# Patient Record
Sex: MALE | Race: WHITE | NOT HISPANIC OR LATINO | Employment: OTHER | URBAN - METROPOLITAN AREA
[De-identification: names, ages, dates, MRNs, and addresses within clinical notes are randomized per-mention and may not be internally consistent; named-entity substitution may affect disease eponyms.]

---

## 2023-08-28 RX ORDER — QUETIAPINE 300 MG/1
300 TABLET, FILM COATED, EXTENDED RELEASE ORAL DAILY
COMMUNITY

## 2023-08-28 RX ORDER — AMLODIPINE BESYLATE 5 MG/1
TABLET ORAL
COMMUNITY

## 2023-08-28 RX ORDER — PHENAZOPYRIDINE HYDROCHLORIDE 200 MG/1
TABLET, FILM COATED ORAL
COMMUNITY

## 2023-08-28 RX ORDER — ACETAMINOPHEN 325 MG/1
TABLET ORAL
COMMUNITY

## 2023-08-28 RX ORDER — METOPROLOL SUCCINATE 50 MG/1
50 TABLET, EXTENDED RELEASE ORAL DAILY
COMMUNITY

## 2023-08-28 RX ORDER — BUPROPION HYDROCHLORIDE 100 MG/1
TABLET, EXTENDED RELEASE ORAL
COMMUNITY

## 2023-08-28 RX ORDER — LISINOPRIL AND HYDROCHLOROTHIAZIDE 12.5; 1 MG/1; MG/1
TABLET ORAL
COMMUNITY

## 2023-08-28 RX ORDER — CLONIDINE HYDROCHLORIDE 0.1 MG/1
TABLET ORAL
COMMUNITY

## 2023-08-28 RX ORDER — LANOLIN ALCOHOL/MO/W.PET/CERES
CREAM (GRAM) TOPICAL
COMMUNITY

## 2023-08-28 RX ORDER — LOPERAMIDE HYDROCHLORIDE 2 MG/1
2 CAPSULE ORAL
COMMUNITY
Start: 2023-03-06

## 2023-08-28 RX ORDER — OMEPRAZOLE 40 MG/1
CAPSULE, DELAYED RELEASE ORAL
COMMUNITY

## 2023-08-28 RX ORDER — LANOLIN ALCOHOL/MO/W.PET/CERES
400 CREAM (GRAM) TOPICAL DAILY
COMMUNITY

## 2023-08-28 RX ORDER — TAMSULOSIN HYDROCHLORIDE 0.4 MG/1
CAPSULE ORAL
COMMUNITY

## 2023-08-28 RX ORDER — TRAZODONE HYDROCHLORIDE 100 MG/1
200 TABLET ORAL DAILY
COMMUNITY

## 2023-08-29 ENCOUNTER — TELEPHONE (OUTPATIENT)
Dept: GASTROENTEROLOGY | Facility: CLINIC | Age: 78
End: 2023-08-29

## 2023-08-29 ENCOUNTER — OFFICE VISIT (OUTPATIENT)
Dept: GASTROENTEROLOGY | Facility: CLINIC | Age: 78
End: 2023-08-29

## 2023-08-29 VITALS
WEIGHT: 259 LBS | HEIGHT: 70 IN | SYSTOLIC BLOOD PRESSURE: 152 MMHG | DIASTOLIC BLOOD PRESSURE: 98 MMHG | BODY MASS INDEX: 37.08 KG/M2

## 2023-08-29 DIAGNOSIS — Z79.01 LONG TERM CURRENT USE OF ANTICOAGULANT: ICD-10-CM

## 2023-08-29 DIAGNOSIS — Z12.12 SCREENING FOR COLORECTAL CANCER: Primary | ICD-10-CM

## 2023-08-29 DIAGNOSIS — Z12.11 SCREENING FOR COLORECTAL CANCER: Primary | ICD-10-CM

## 2023-08-29 RX ORDER — DOXAZOSIN MESYLATE 1 MG/1
1 TABLET ORAL DAILY
COMMUNITY

## 2023-08-29 RX ORDER — POLYETHYLENE GLYCOL 3350, SODIUM SULFATE ANHYDROUS, SODIUM BICARBONATE, SODIUM CHLORIDE, POTASSIUM CHLORIDE 236; 22.74; 6.74; 5.86; 2.97 G/4L; G/4L; G/4L; G/4L; G/4L
4000 POWDER, FOR SOLUTION ORAL ONCE
Qty: 4000 ML | Refills: 0 | Status: SHIPPED | OUTPATIENT
Start: 2023-08-29 | End: 2023-08-29

## 2023-08-29 RX ORDER — LORAZEPAM 1 MG/1
TABLET ORAL
COMMUNITY

## 2023-08-29 NOTE — PROGRESS NOTES
[address] [facility] - Outpatient Consultation  Lisa Heredia 78 y.o. male MRN: [id number]  Encounter: [id number]    ASSESSMENT AND PLAN:      1. Screening for colorectal cancer  Patient is due for colon cancer screening with colonoscopy and is at average risk for colorectal cancer. Prior colonoscopy: 2017  Patient is on Eliquis. Will need to hold for 2 days prior to his colonoscopy    We will schedule the patient for colonoscopy and have advised the patient to take the bowel preparation in a split dose bowel preparation. I have discussed with the patient the risks and benefits and alternatives of the procedure which include but are not limited to bleeding, infection, aspiration, perforation.       - Colonoscopy; Future  - polyethylene glycol (Golytely) 4000 mL solution; Take 4,000 mL by mouth once for 1 dose Take 4000 mL by mouth once for 1 dose. Use as directed  Dispense: 4000 mL; Refill: 0    2. Long term current use of anticoagulant  We will need to hold Eliquis for 2 days prior to colonoscopy. Patient on chronic anticoagulation. Risks and benefits of holding the anticoagulation prior to the procedure discussed with the patient. Patient understands the risks of embolic events while holding the anticoagulation. Patient understands the risks of bleeding if continuation of the anticoagulation is chosen. Follow up Appointment: For colonoscopy    Chief Complaint   Patient presents with   • Colonscopy consult     Pt is due for screening colonoscopy. Father had colon cancer. Pt is not experiencing any GI symptoms. HPI:   Patient is a 72-year-old male past medical history of renal cell carcinoma status post nephrectomy with recurrent lesions in the kidney, A-fib on Eliquis, AVELINA, CKD, who is presenting for colorectal cancer screening. Patient does have a family history of colorectal cancer in his father. Last colonoscopy was in 2017.   He does have history of polyps in the past.    Patient stated no reported lightheadedness, dizziness, fevers, chills, nausea, vomiting, dysphagia, heartburn, SOB, CP, abdominal pain, changes in bowel movement with diarrhea or constipation, GI bleeding, or unintentional weight loss. Historical Information   No past medical history on file. No past surgical history on file. Social History     Substance and Sexual Activity   Alcohol Use Not on file     Social History     Substance and Sexual Activity   Drug Use Not on file     Social History     Tobacco Use   Smoking Status Not on file   Smokeless Tobacco Not on file     No family history on file. Meds/Allergies     Current Outpatient Medications:   •  acetaminophen (TYLENOL) 325 mg tablet  •  amLODIPine (NORVASC) 5 mg tablet  •  apixaban (Eliquis) 2.5 mg  •  Cholecalciferol 125 MCG (5000 UT) TABS  •  cyanocobalamin (VITAMIN B-12) 2500 MCG TABS  •  doxazosin (CARDURA) 1 mg tablet  •  folic acid (FOLVITE) 187 mcg tablet  •  loperamide (IMODIUM) 2 mg capsule  •  metoprolol succinate (TOPROL-XL) 50 mg 24 hr tablet  •  omeprazole (PriLOSEC) 40 MG capsule  •  polyethylene glycol (Golytely) 4000 mL solution  •  QUEtiapine (SEROquel XR) 300 mg 24 hr tablet  •  traZODone (DESYREL) 100 mg tablet  •  buPROPion (WELLBUTRIN SR) 100 mg 12 hr tablet  •  cloNIDine (CATAPRES) 0.1 mg tablet  •  lisinopril-hydrochlorothiazide (PRINZIDE,ZESTORETIC) 10-12.5 MG per tablet  •  LORazepam (ATIVAN) 1 mg tablet  •  phenazopyridine (PYRIDIUM) 200 mg tablet  •  tamsulosin (FLOMAX) 0.4 mg  •  thiamine 100 MG tablet    Allergies   Allergen Reactions   • Morphine And Related Other (See Comments)     History of opioid use and dependence. The patient and family do not want opioids administered without express consent beforehand. PHYSICAL EXAM:    Blood pressure 152/98, height 5' 10" (1.778 m), weight 117 kg (259 lb). Body mass index is 37.16 kg/m².   General Appearance: NAD, cooperative, alert  Eyes: Anicteric  GI:  Soft, non-tender, non-distended; normal bowel sounds; no masses, no organomegaly   Rectal: Deferred  Musculoskeletal: No edema. Skin:  No jaundice    Lab Results:   No results found for: "WBC", "HGB", "MCV", "PLT", "INR"  No results found for: "NA", "K", "CL", "CO2", "ANIONGAP", "BUN", "CREATININE", "GLUCOSE", "GLUF", "CALCIUM", "Jaiden Riya", "AST", "ALT", "ALKPHOS", "PROT", "BILITOT", "EGFR"  No results found for: "IRON", "TIBC", "FERRITIN"  No results found for: "LIPASE"    Radiology Results:   CT kidney thermal ablation    Result Date: 8/25/2023  Narrative: EXAM TYPE:  CT THERMAL ABLATION EXAM DATE AND TIME:  8/23/2023 11:41 AM EDT HISTORY: Adrenal gland mass MWA COMPARISON: CT chest 6/26/2023 and MRI abdomen 6/1/2023 INDICATION: Left adrenal mass. PROCEDURE/FINDINGS: Anesthesia: General Patient position: Prone Number of lesions ablated 1 Lesion 1 Dimensions: 2.6 x 3.8 cm Shape: round Location: Left adrenal gland Probe Number: 2 Probe Type: CO 15cm Ablation Time: 65 valderrama 6 minutes Constant power/ramp up: constant Hydrodissection: Yes - fluid was instilled along the medial aspect of the adrenal mass to separate from the abdominal wall/diaphragmatic marilee Number probe placement attempts per probe: 1 Number of changes in probe placement throughout the ablation: 0 Sequential or simultaneous probe activation: simultaneous Location of probe placement relative to the target lesion: peripheral Tract ablation: Yes placement guidance method used: CT image fusion: no Final CT abdomen noncontrast and delayed phase. Final CT findings: Hypoenhancing left adrenal mass. No suspicious retroperitoneal hematoma. Adjacent free fluid from hydrodissection. COMPLICATIONS: The patient tolerated the procedure well without immediate complication. MEDICATIONS: 1% lidocaine subcutaneous was given for local anesthesia.  I was present for the entire procedure, and I personally supervised and directly participated in the performance of this procedure.  I have reviewed the images and the resident's report and agree with the interpretation and conclusions. Impression: Successful left adrenal mass microwave ablation.

## 2023-08-29 NOTE — TELEPHONE ENCOUNTER
Scheduled date of colonoscopy (as of today):11/15/23  Physician performing colonoscopy: Dr Renata Trammell  Location of colonoscopy: [facility]  Bowel prep reviewed with patient: golytely  Instructions reviewed with patient by: gave patient packet  Clearances: Eliquis

## 2023-11-03 ENCOUNTER — TELEPHONE (OUTPATIENT)
Dept: GASTROENTEROLOGY | Facility: CLINIC | Age: 78
End: 2023-11-03

## 2023-11-09 ENCOUNTER — TELEPHONE (OUTPATIENT)
Dept: GASTROENTEROLOGY | Facility: CLINIC | Age: 78
End: 2023-11-09

## 2023-11-15 ENCOUNTER — ANESTHESIA EVENT (OUTPATIENT)
Dept: GASTROENTEROLOGY | Facility: HOSPITAL | Age: 78
End: 2023-11-15

## 2023-11-15 ENCOUNTER — ANESTHESIA (OUTPATIENT)
Dept: GASTROENTEROLOGY | Facility: HOSPITAL | Age: 78
End: 2023-11-15

## 2023-11-15 ENCOUNTER — HOSPITAL ENCOUNTER (OUTPATIENT)
Dept: GASTROENTEROLOGY | Facility: HOSPITAL | Age: 78
Setting detail: OUTPATIENT SURGERY
Discharge: HOME | End: 2023-11-15
Attending: INTERNAL MEDICINE | Admitting: INTERNAL MEDICINE
Payer: COMMERCIAL

## 2023-11-15 VITALS
SYSTOLIC BLOOD PRESSURE: 121 MMHG | HEART RATE: 64 BPM | DIASTOLIC BLOOD PRESSURE: 74 MMHG | TEMPERATURE: 97.9 F | OXYGEN SATURATION: 96 % | RESPIRATION RATE: 18 BRPM

## 2023-11-15 DIAGNOSIS — Z12.11 SCREENING FOR COLORECTAL CANCER: ICD-10-CM

## 2023-11-15 DIAGNOSIS — Z12.12 SCREENING FOR COLORECTAL CANCER: ICD-10-CM

## 2023-11-15 PROCEDURE — 45385 COLONOSCOPY W/LESION REMOVAL: CPT | Performed by: INTERNAL MEDICINE

## 2023-11-15 PROCEDURE — 45380 COLONOSCOPY AND BIOPSY: CPT | Performed by: INTERNAL MEDICINE

## 2023-11-15 PROCEDURE — 88305 TISSUE EXAM BY PATHOLOGIST: CPT | Performed by: PATHOLOGY

## 2023-11-15 RX ORDER — LIDOCAINE HYDROCHLORIDE 20 MG/ML
INJECTION, SOLUTION EPIDURAL; INFILTRATION; INTRACAUDAL; PERINEURAL AS NEEDED
Status: DISCONTINUED | OUTPATIENT
Start: 2023-11-15 | End: 2023-11-15

## 2023-11-15 RX ORDER — SODIUM CHLORIDE, SODIUM LACTATE, POTASSIUM CHLORIDE, CALCIUM CHLORIDE 600; 310; 30; 20 MG/100ML; MG/100ML; MG/100ML; MG/100ML
INJECTION, SOLUTION INTRAVENOUS CONTINUOUS PRN
Status: DISCONTINUED | OUTPATIENT
Start: 2023-11-15 | End: 2023-11-15

## 2023-11-15 RX ORDER — PROPOFOL 10 MG/ML
INJECTION, EMULSION INTRAVENOUS AS NEEDED
Status: DISCONTINUED | OUTPATIENT
Start: 2023-11-15 | End: 2023-11-15

## 2023-11-15 RX ADMIN — SODIUM CHLORIDE, SODIUM LACTATE, POTASSIUM CHLORIDE, AND CALCIUM CHLORIDE: .6; .31; .03; .02 INJECTION, SOLUTION INTRAVENOUS at 07:35

## 2023-11-15 RX ADMIN — PROPOFOL 50 MG: 10 INJECTION, EMULSION INTRAVENOUS at 07:44

## 2023-11-15 RX ADMIN — PROPOFOL 100 MG: 10 INJECTION, EMULSION INTRAVENOUS at 07:35

## 2023-11-15 RX ADMIN — PROPOFOL 50 MG: 10 INJECTION, EMULSION INTRAVENOUS at 07:55

## 2023-11-15 RX ADMIN — PROPOFOL 50 MG: 10 INJECTION, EMULSION INTRAVENOUS at 07:37

## 2023-11-15 RX ADMIN — PROPOFOL 50 MG: 10 INJECTION, EMULSION INTRAVENOUS at 07:49

## 2023-11-15 RX ADMIN — PROPOFOL 50 MG: 10 INJECTION, EMULSION INTRAVENOUS at 07:40

## 2023-11-15 RX ADMIN — LIDOCAINE HYDROCHLORIDE 100 MG: 20 INJECTION, SOLUTION EPIDURAL; INFILTRATION; INTRACAUDAL; PERINEURAL at 07:35

## 2023-11-15 NOTE — ANESTHESIA POSTPROCEDURE EVALUATION
Post-Op Assessment Note    CV Status:  Stable  Pain Score: 0    Pain management: adequate       Mental Status:  Alert and awake   Hydration Status:  Euvolemic   PONV Controlled:  Controlled   Airway Patency:  Patent     Post Op Vitals Reviewed: Yes    No anethesia notable event occurred.     Staff: Anesthesiologist, CRNA               /74 (11/15/23 5208)    Temp      Pulse 64 (11/15/23 0812)   Resp 18 (11/15/23 0812)    SpO2 96 % (11/15/23 3885)

## 2023-11-15 NOTE — ANESTHESIA PREPROCEDURE EVALUATION
Procedure:  COLONOSCOPY    Relevant Problems   No relevant active problems      Past Medical History:   Diagnosis Date    A-fib ([address])     Anxiety     GERD (gastroesophageal reflux disease)     Hypertension        Physical Exam    Airway    Mallampati score: II  TM Distance: >3 FB  Neck ROM: full     Dental    lower dentures and upper dentures,     Cardiovascular  Rhythm: regular, Rate: normal    Pulmonary   Breath sounds clear to auscultation    Other Findings  Intercisor Distance > 3cm          Anesthesia Plan  ASA Score- 2     Anesthesia Type- IV sedation with anesthesia with ASA Monitors. Additional Monitors:     Airway Plan:     Comment: NPO appropriate. Discussed benefits/risks of monitored anesthetic care which involves providing a dynamic level of mild to deep sedation. Complications include awareness and/or airway obstruction/aspiration which may necessitate conversion to general anesthesia. All questions answered. Patient understands and wishes to proceed. .       Plan Factors-Exercise tolerance (METS): >4 METS. Chart reviewed. EKG reviewed. Existing labs reviewed. Induction-     Postoperative Plan- Plan for postoperative opioid use. Informed Consent- Anesthetic plan and risks discussed with patient. I personally reviewed this patient with the CRNA. Discussed and agreed on the Anesthesia Plan with the CRNA. Caitlyn Hart

## 2023-11-15 NOTE — H&P
History and Physical - [facility]  Gaurav Amos 70 y.o. male MRN: [id number]    HPI: Gaurav Amos is a 70 y.o. male who presents for colonoscopy for colorectal cancer screening. Held Eliquis for 2 days. REVIEW OF SYSTEMS: Per the HPI, and otherwise unremarkable. Historical Information   Past Medical History:   Diagnosis Date    A-fib ([address])     Anxiety     GERD (gastroesophageal reflux disease)     Hypertension      History reviewed. No pertinent surgical history. Social History   Social History     Substance and Sexual Activity   Alcohol Use Not Currently    Comment: quit in      Social History     Substance and Sexual Activity   Drug Use Never     Social History     Tobacco Use   Smoking Status Former    Types: Cigarettes    Quit date:     Years since quittin.8   Smokeless Tobacco Not on file     History reviewed. No pertinent family history. Meds/Allergies       Current Outpatient Medications:     acetaminophen (TYLENOL) 325 mg tablet    amLODIPine (NORVASC) 5 mg tablet    Cholecalciferol 125 MCG (5000 UT) TABS    cyanocobalamin (VITAMIN B-12) 2500 MCG TABS    doxazosin (CARDURA) 1 mg tablet    folic acid (FOLVITE) 937 mcg tablet    loperamide (IMODIUM) 2 mg capsule    metoprolol succinate (TOPROL-XL) 50 mg 24 hr tablet    omeprazole (PriLOSEC) 40 MG capsule    QUEtiapine (SEROquel XR) 300 mg 24 hr tablet    traZODone (DESYREL) 100 mg tablet    apixaban (Eliquis) 2.5 mg    buPROPion (WELLBUTRIN SR) 100 mg 12 hr tablet    cloNIDine (CATAPRES) 0.1 mg tablet    lisinopril-hydrochlorothiazide (PRINZIDE,ZESTORETIC) 10-12.5 MG per tablet    LORazepam (ATIVAN) 1 mg tablet    phenazopyridine (PYRIDIUM) 200 mg tablet    polyethylene glycol (Golytely) 4000 mL solution    tamsulosin (FLOMAX) 0.4 mg    thiamine 100 MG tablet    Allergies   Allergen Reactions    Morphine And Related Other (See Comments)     History of opioid use and dependence.  The patient and family do not want opioids administered without express consent beforehand. Objective     /82   Pulse 66   Temp 97.9 °F (36.6 °C) (Temporal)   Resp 18   SpO2 97%     PHYSICAL EXAM    Gen: NAD AAOx3  Head: Normocephalic, Atraumatic  CV: S1S2 RRR no m/r/g  CHEST: Clear b/l no c/r/w  ABD: soft, +BS NT/ND  EXT: no edema    ASSESSMENT/PLAN:  This is a 70y.o. year old male here for colonoscopy, and he is stable and optimized for his procedure.

## 2023-11-20 PROCEDURE — 88305 TISSUE EXAM BY PATHOLOGIST: CPT | Performed by: PATHOLOGY
